# Patient Record
Sex: MALE | Race: WHITE | Employment: OTHER | URBAN - METROPOLITAN AREA
[De-identification: names, ages, dates, MRNs, and addresses within clinical notes are randomized per-mention and may not be internally consistent; named-entity substitution may affect disease eponyms.]

---

## 2017-03-30 ENCOUNTER — OFFICE VISIT (OUTPATIENT)
Dept: CARDIOLOGY CLINIC | Age: 82
End: 2017-03-30

## 2017-03-30 VITALS
WEIGHT: 202 LBS | HEART RATE: 68 BPM | OXYGEN SATURATION: 92 % | SYSTOLIC BLOOD PRESSURE: 170 MMHG | BODY MASS INDEX: 28.98 KG/M2 | DIASTOLIC BLOOD PRESSURE: 62 MMHG

## 2017-03-30 DIAGNOSIS — I25.83 CORONARY ARTERY DISEASE DUE TO LIPID RICH PLAQUE: Primary | ICD-10-CM

## 2017-03-30 DIAGNOSIS — I10 ESSENTIAL HYPERTENSION, BENIGN: Chronic | ICD-10-CM

## 2017-03-30 DIAGNOSIS — I25.10 CORONARY ARTERY DISEASE DUE TO LIPID RICH PLAQUE: Primary | ICD-10-CM

## 2017-03-30 DIAGNOSIS — R42 DIZZINESS: ICD-10-CM

## 2017-03-30 DIAGNOSIS — I35.9 AORTIC VALVE DISORDER: Chronic | ICD-10-CM

## 2017-03-30 PROCEDURE — G8484 FLU IMMUNIZE NO ADMIN: HCPCS | Performed by: NURSE PRACTITIONER

## 2017-03-30 PROCEDURE — G8598 ASA/ANTIPLAT THER USED: HCPCS | Performed by: NURSE PRACTITIONER

## 2017-03-30 PROCEDURE — 1123F ACP DISCUSS/DSCN MKR DOCD: CPT | Performed by: NURSE PRACTITIONER

## 2017-03-30 PROCEDURE — G8427 DOCREV CUR MEDS BY ELIG CLIN: HCPCS | Performed by: NURSE PRACTITIONER

## 2017-03-30 PROCEDURE — 4040F PNEUMOC VAC/ADMIN/RCVD: CPT | Performed by: NURSE PRACTITIONER

## 2017-03-30 PROCEDURE — 1036F TOBACCO NON-USER: CPT | Performed by: NURSE PRACTITIONER

## 2017-03-30 PROCEDURE — G8420 CALC BMI NORM PARAMETERS: HCPCS | Performed by: NURSE PRACTITIONER

## 2017-03-30 PROCEDURE — 99214 OFFICE O/P EST MOD 30 MIN: CPT | Performed by: NURSE PRACTITIONER

## 2017-03-30 RX ORDER — DONEPEZIL HYDROCHLORIDE 10 MG/1
10 TABLET, FILM COATED ORAL DAILY
COMMUNITY

## 2017-03-30 RX ORDER — RAMIPRIL 5 MG/1
5 CAPSULE ORAL 2 TIMES DAILY
Qty: 30 CAPSULE | Refills: 3 | Status: SHIPPED | OUTPATIENT
Start: 2017-03-30

## 2017-05-03 ENCOUNTER — OFFICE VISIT (OUTPATIENT)
Dept: CARDIOLOGY CLINIC | Age: 82
End: 2017-05-03

## 2017-05-03 VITALS
HEIGHT: 70 IN | WEIGHT: 203 LBS | DIASTOLIC BLOOD PRESSURE: 56 MMHG | BODY MASS INDEX: 29.06 KG/M2 | SYSTOLIC BLOOD PRESSURE: 120 MMHG | HEART RATE: 66 BPM

## 2017-05-03 DIAGNOSIS — I10 ESSENTIAL HYPERTENSION, BENIGN: ICD-10-CM

## 2017-05-03 DIAGNOSIS — I25.10 CORONARY ARTERY DISEASE INVOLVING NATIVE CORONARY ARTERY OF NATIVE HEART WITHOUT ANGINA PECTORIS: Primary | ICD-10-CM

## 2017-05-03 DIAGNOSIS — R42 DIZZY: ICD-10-CM

## 2017-05-03 DIAGNOSIS — I35.0 NONRHEUMATIC AORTIC VALVE STENOSIS: ICD-10-CM

## 2017-05-03 DIAGNOSIS — E78.00 HYPERCHOLESTEREMIA: ICD-10-CM

## 2017-05-03 PROCEDURE — 1036F TOBACCO NON-USER: CPT | Performed by: INTERNAL MEDICINE

## 2017-05-03 PROCEDURE — G8420 CALC BMI NORM PARAMETERS: HCPCS | Performed by: INTERNAL MEDICINE

## 2017-05-03 PROCEDURE — G8427 DOCREV CUR MEDS BY ELIG CLIN: HCPCS | Performed by: INTERNAL MEDICINE

## 2017-05-03 PROCEDURE — 99214 OFFICE O/P EST MOD 30 MIN: CPT | Performed by: INTERNAL MEDICINE

## 2017-05-03 PROCEDURE — 1123F ACP DISCUSS/DSCN MKR DOCD: CPT | Performed by: INTERNAL MEDICINE

## 2017-05-03 PROCEDURE — G8598 ASA/ANTIPLAT THER USED: HCPCS | Performed by: INTERNAL MEDICINE

## 2017-05-03 PROCEDURE — 4040F PNEUMOC VAC/ADMIN/RCVD: CPT | Performed by: INTERNAL MEDICINE

## 2017-05-03 RX ORDER — DILTIAZEM HYDROCHLORIDE 120 MG/1
CAPSULE, EXTENDED RELEASE ORAL
Qty: 90 CAPSULE | Refills: 3 | Status: SHIPPED | OUTPATIENT
Start: 2017-05-03

## 2017-05-03 RX ORDER — POTASSIUM CHLORIDE 7.45 MG/ML
10 INJECTION INTRAVENOUS ONCE
COMMUNITY
End: 2019-05-15

## 2017-05-03 RX ORDER — DILTIAZEM HYDROCHLORIDE 120 MG/1
120 CAPSULE, EXTENDED RELEASE ORAL DAILY
Qty: 30 CAPSULE | Refills: 11 | Status: SHIPPED | OUTPATIENT
Start: 2017-05-03 | End: 2017-05-03 | Stop reason: SDUPTHER

## 2017-05-04 ENCOUNTER — TELEPHONE (OUTPATIENT)
Dept: CARDIOLOGY CLINIC | Age: 82
End: 2017-05-04

## 2017-05-04 DIAGNOSIS — I50.9 CONGESTIVE HEART FAILURE, UNSPECIFIED CONGESTIVE HEART FAILURE CHRONICITY, UNSPECIFIED CONGESTIVE HEART FAILURE TYPE: Primary | ICD-10-CM

## 2017-05-04 DIAGNOSIS — E11.59 TYPE 2 DIABETES MELLITUS WITH OTHER CIRCULATORY COMPLICATION: ICD-10-CM

## 2017-08-03 ENCOUNTER — TELEPHONE (OUTPATIENT)
Dept: CARDIOLOGY CLINIC | Age: 82
End: 2017-08-03

## 2017-11-08 ENCOUNTER — HOSPITAL ENCOUNTER (OUTPATIENT)
Dept: NON INVASIVE DIAGNOSTICS | Age: 82
Discharge: HOME | End: 2017-11-08
Attending: INTERNAL MEDICINE | Admitting: INTERNAL MEDICINE

## 2017-11-08 ENCOUNTER — OFFICE VISIT (OUTPATIENT)
Dept: CARDIOLOGY CLINIC | Age: 82
End: 2017-11-08

## 2017-11-08 VITALS
HEART RATE: 76 BPM | DIASTOLIC BLOOD PRESSURE: 54 MMHG | OXYGEN SATURATION: 91 % | WEIGHT: 205 LBS | BODY MASS INDEX: 29.35 KG/M2 | SYSTOLIC BLOOD PRESSURE: 110 MMHG | HEIGHT: 70 IN

## 2017-11-08 DIAGNOSIS — R60.9 EDEMA, UNSPECIFIED TYPE: ICD-10-CM

## 2017-11-08 DIAGNOSIS — I87.393 CHRONIC VENOUS HYPERTENSION (IDIOPATHIC) WITH OTHER COMPLICATIONS OF BILATERAL LOWER EXTREMITY: ICD-10-CM

## 2017-11-08 DIAGNOSIS — E78.00 HYPERCHOLESTEREMIA: ICD-10-CM

## 2017-11-08 DIAGNOSIS — I35.0 NONRHEUMATIC AORTIC VALVE STENOSIS: ICD-10-CM

## 2017-11-08 DIAGNOSIS — I10 ESSENTIAL HYPERTENSION: ICD-10-CM

## 2017-11-08 DIAGNOSIS — I25.10 CORONARY ARTERY DISEASE INVOLVING NATIVE CORONARY ARTERY OF NATIVE HEART WITHOUT ANGINA PECTORIS: Primary | ICD-10-CM

## 2017-11-08 DIAGNOSIS — I87.2 VENOUS INSUFFICIENCY: ICD-10-CM

## 2017-11-08 DIAGNOSIS — I25.10 ATHEROSCLEROTIC HEART DISEASE OF NATIVE CORONARY ARTERY WITHOUT ANGINA PECTORIS: ICD-10-CM

## 2017-11-08 LAB
LEFT VENTRICULAR EJECTION FRACTION HIGH VALUE: 60 %
LEFT VENTRICULAR EJECTION FRACTION MODE: NORMAL
LV EF: 60 %
LVEF MODALITY: NORMAL

## 2017-11-08 PROCEDURE — G8427 DOCREV CUR MEDS BY ELIG CLIN: HCPCS | Performed by: INTERNAL MEDICINE

## 2017-11-08 PROCEDURE — 4040F PNEUMOC VAC/ADMIN/RCVD: CPT | Performed by: INTERNAL MEDICINE

## 2017-11-08 PROCEDURE — 99214 OFFICE O/P EST MOD 30 MIN: CPT | Performed by: INTERNAL MEDICINE

## 2017-11-08 PROCEDURE — G8598 ASA/ANTIPLAT THER USED: HCPCS | Performed by: INTERNAL MEDICINE

## 2017-11-08 PROCEDURE — G8484 FLU IMMUNIZE NO ADMIN: HCPCS | Performed by: INTERNAL MEDICINE

## 2017-11-08 PROCEDURE — 1036F TOBACCO NON-USER: CPT | Performed by: INTERNAL MEDICINE

## 2017-11-08 PROCEDURE — 1123F ACP DISCUSS/DSCN MKR DOCD: CPT | Performed by: INTERNAL MEDICINE

## 2017-11-08 PROCEDURE — G8419 CALC BMI OUT NRM PARAM NOF/U: HCPCS | Performed by: INTERNAL MEDICINE

## 2017-11-08 RX ORDER — ESOMEPRAZOLE MAGNESIUM 40 MG/1
20 FOR SUSPENSION ORAL DAILY
COMMUNITY
End: 2019-05-15

## 2017-11-08 NOTE — PROGRESS NOTES
Outpatient Visit  Consultation / History and Physical / Followup Visit     Referring Provider:   Candice Garcia MD     [phone]    Encounter Type:   []New Patient  [x]Interventional  [x]Followup Patient [x]Structural      Chief Complaint  Visit [] Acute [x] Chronic     Sx [x] None [] CP [] SOB [] Dizzy  Palps [] Fatigue     Problems CAD s/p MVPCI, AS, HTN, Chol     HPI  Doing well. Concerned with mild LE swelling and itching. Has been scratching a lot with multiple subcutaneous hemorrhages. Wearing [employer] \"compression copper socks\". Denies cp, sob. CV HPI  Symptom Y N Frequency Duration Severity Modifying Assoc Sx Other   CP [] [x]         SOB [] [x]         Dizzy [] [x]         Syncope [] [x]         Palpitations [] [x]         Other [] [x]           Compliance(Check if compliant):   [x]Med []Diet [x]Salt [x]Tob [x]Alc [x]Drg []Exercise  [x]Counseling given on all above above categories    MedHx:  has a past medical history of Aortic insufficiency; Aortic stenosis; Arthritis; Bleeding ulcer; CAD (coronary artery disease); CHF (congestive heart failure) ([facility]); COPD (chronic obstructive pulmonary disease) ([facility] [address] Diabetes mellitus ([facility] [address] History of cerebral infarction; Hyperlipidemia; Hypertension; Pneumonia; and Thyroid disease. SurgHx:  has a past surgical history that includes Diagnostic Cardiac Cath Lab Procedure; Coronary angioplasty with stent; Breast surgery; Tonsillectomy; Colonoscopy; Endoscopy, colon, diagnostic; and Cardiac surgery. SocHx:  reports that he quit smoking about 25 years ago. He does not have any smokeless tobacco history on file. He reports that he drinks alcohol. He reports that he does not use drugs. FamHx: family history includes Cancer in his sister; Heart Disease in his father.    CVMeds: Reviewed and will remain unchanged except as mentioned in A/P      Current Outpatient Prescriptions   Medication Sig Dispense Refill    Elastic Bandages & Supports (MEDICAL

## 2017-11-08 NOTE — COMMUNICATION BODY
Outpatient Visit  Consultation / History and Physical / Followup Visit     Referring Provider:   Edelmira Torres MD     [phone]    Encounter Type:   []New Patient  [x]Interventional  [x]Followup Patient [x]Structural      Chief Complaint  Visit [] Acute [x] Chronic     Sx [x] None [] CP [] SOB [] Dizzy  Palps [] Fatigue     Problems CAD s/p MVPCI, AS, HTN, Chol     HPI  Doing well. Concerned with mild LE swelling and itching. Has been scratching a lot with multiple subcutaneous hemorrhages. Wearing [employer] \"compression copper socks\". Denies cp, sob. CV HPI  Symptom Y N Frequency Duration Severity Modifying Assoc Sx Other   CP [] [x]         SOB [] [x]         Dizzy [] [x]         Syncope [] [x]         Palpitations [] [x]         Other [] [x]           Compliance(Check if compliant):   [x]Med []Diet [x]Salt [x]Tob [x]Alc [x]Drg []Exercise  [x]Counseling given on all above above categories    MedHx:  has a past medical history of Aortic insufficiency; Aortic stenosis; Arthritis; Bleeding ulcer; CAD (coronary artery disease); CHF (congestive heart failure) ([facility]); COPD (chronic obstructive pulmonary disease) ([facility] [address] Diabetes mellitus ([facility] [address] History of cerebral infarction; Hyperlipidemia; Hypertension; Pneumonia; and Thyroid disease. SurgHx:  has a past surgical history that includes Diagnostic Cardiac Cath Lab Procedure; Coronary angioplasty with stent; Breast surgery; Tonsillectomy; Colonoscopy; Endoscopy, colon, diagnostic; and Cardiac surgery. SocHx:  reports that he quit smoking about 25 years ago. He does not have any smokeless tobacco history on file. He reports that he drinks alcohol. He reports that he does not use drugs. FamHx: family history includes Cancer in his sister; Heart Disease in his father.    CVMeds: Reviewed and will remain unchanged except as mentioned in A/P      Current Outpatient Prescriptions   Medication Sig Dispense Refill    Elastic Bandages & Supports (MEDICAL COMPRESSION STOCKINGS) MISC 1 each by Does not apply route daily 1 each 2    Glucose Blood (GLUCOSE METER TEST VI) by In Vitro route      glucose blood VI test strips (ASCENSIA AUTODISC VI;ONE TOUCH ULTRA TEST VI) strip 1 each by In Vitro route daily As needed.  potassium chloride 10 MEQ/100ML Infuse 10 mEq intravenously once      DILT- MG extended release capsule TAKE 1 CAPSULE BY MOUTH DAILY 90 capsule 3    donepezil (ARICEPT) 10 MG tablet Take 10 mg by mouth daily      ramipril (ALTACE) 5 MG capsule Take 1 capsule by mouth 2 times daily 30 capsule 3    SYMBICORT 160-4.5 MCG/ACT AERO       simvastatin (ZOCOR) 20 MG tablet 10 mg nightly       acetaminophen (TYLENOL) 325 MG tablet Take 650 mg by mouth 2 times daily.  metFORMIN ER (GLUCOPHAGE-XR) 750 MG XR tablet Take 750 mg by mouth daily (with breakfast)       lansoprazole (PREVACID) 30 MG capsule Take 30 mg by mouth daily.  fish oil-omega-3 fatty acids 1000 MG capsule Take 2 g by mouth daily.  aspirin 81 MG tablet Take 81 mg by mouth daily.  levothyroxine (LEVOXYL) 75 MCG tablet Take 75 mcg by mouth daily.  furosemide (LASIX) 20 MG tablet Take 20 mg by mouth 2 times daily.  albuterol-ipratropium (COMBIVENT)  MCG/ACT inhaler Inhale 1 puff into the lungs every 4 hours as needed. No current facility-administered medications for this visit. Allergies: Dye [iodides];  Iodine; Metoprolol; and Nsaids   ROS:  [x]Full ROS obtained and negative except as mentioned in HPI  Exam:    Vitals:    11/08/17 1329   BP: (!) 110/54   Pulse: 76   SpO2: 91%      Appearance:  Alert, cooperative, no distress, appears stated age   Head:  Normocephalic, without obvious abnormality, atraumatic   Eyes:  PERRL, conjunctiva/corneas clear   Nose: Nares normal, no drainage or sinus tenderness   Throat: Lips, mucosa, and tongue normal   Neck: Supple, symmetrical, trachea midline, no adenopathy, thyroid: not enlarged,

## 2017-11-08 NOTE — LETTER
Tonsillectomy; Colonoscopy; Endoscopy, colon, diagnostic; and Cardiac surgery. SocHx:  reports that he quit smoking about 25 years ago. He does not have any smokeless tobacco history on file. He reports that he drinks alcohol. He reports that he does not use drugs. FamHx: family history includes Cancer in his sister; Heart Disease in his father. CVMeds: Reviewed and will remain unchanged except as mentioned in A/P      Current Outpatient Prescriptions   Medication Sig Dispense Refill    Elastic Bandages & Supports (MEDICAL COMPRESSION STOCKINGS) MISC 1 each by Does not apply route daily 1 each 2    Glucose Blood (GLUCOSE METER TEST VI) by In Vitro route      glucose blood VI test strips (ASCENSIA AUTODISC VI;ONE TOUCH ULTRA TEST VI) strip 1 each by In Vitro route daily As needed.  potassium chloride 10 MEQ/100ML Infuse 10 mEq intravenously once      DILT- MG extended release capsule TAKE 1 CAPSULE BY MOUTH DAILY 90 capsule 3    donepezil (ARICEPT) 10 MG tablet Take 10 mg by mouth daily      ramipril (ALTACE) 5 MG capsule Take 1 capsule by mouth 2 times daily 30 capsule 3    SYMBICORT 160-4.5 MCG/ACT AERO       simvastatin (ZOCOR) 20 MG tablet 10 mg nightly       acetaminophen (TYLENOL) 325 MG tablet Take 650 mg by mouth 2 times daily.  metFORMIN ER (GLUCOPHAGE-XR) 750 MG XR tablet Take 750 mg by mouth daily (with breakfast)       lansoprazole (PREVACID) 30 MG capsule Take 30 mg by mouth daily.  fish oil-omega-3 fatty acids 1000 MG capsule Take 2 g by mouth daily.  aspirin 81 MG tablet Take 81 mg by mouth daily.  levothyroxine (LEVOXYL) 75 MCG tablet Take 75 mcg by mouth daily.  furosemide (LASIX) 20 MG tablet Take 20 mg by mouth 2 times daily.  albuterol-ipratropium (COMBIVENT)  MCG/ACT inhaler Inhale 1 puff into the lungs every 4 hours as needed. No current facility-administered medications for this visit. Allergies: Dye [iodides];  Iodine; Metoprolol; and Nsaids   ROS:  Full ROS obtained and negative except as mentioned in HPI  Exam:    Vitals:    11/08/17 1329   BP: (!) 110/54   Pulse: 76   SpO2: 91%      Appearance:  Alert, cooperative, no distress, appears stated age   Head:  Normocephalic, without obvious abnormality, atraumatic   Eyes:  PERRL, conjunctiva/corneas clear   Nose: Nares normal, no drainage or sinus tenderness   Throat: Lips, mucosa, and tongue normal   Neck: Supple, symmetrical, trachea midline, no adenopathy, thyroid: not enlarged, symmetric, no tenderness/mass/nodules, no carotid bruit or JVD   Lungs:   Clear to auscultation bilaterally, respirations unlabored   Chest Wall:  No tenderness or deformity   Heart:  RRR, 3/6   Abdomen:   Soft, non-tender, bowel sounds active all four quadrants,  no masses, no organomegaly   Extremities: Extremities normal, atraumatic, no cyanosis, +edema  Numerous bilateral varicosities, subcut hemorrhages, spiders   Pulses: 2+ and symmetric   Skin: Skin color, texture, turgor normal, no rashes or lesions   Pysch: Normal mood and affect   Lymph: No cervical or axillary LA   Neurologic: Normal gross motor and sensory exam.      A/P:   ~CAD   Date EF Results   Sx   No concerning   Hx   MVPCI   LHC 9/13  Patent stents, nonobstructive   MPI 9/13  Inferoapical I/s   Plan   Continue aggressive medical treatment at doses above  No BB due to dizziness   ~AS   Date EF Detail   Sx   No concerning   Hx   No intervention   NYHA   I         II           III          IV   TTE 10/14  9/15  11/16 55%  55%  55% AS MG 18  AS MG 22  AS MG 23   Plan   Repeat echo in 1 year, sooner with symptoms   ~HTN  Today BP  Controlled  Borderline  Uncontrolled   Counseling  Diet/Salt  Exercise  Weight    Plan Continue current medications at doses detailed above  ~Hyperchol  Goal LDL  <100  <70     Counseling  Diet  Weight  Exercise   Lipid/liver Monitor  PCP  Cardio  Endocrine

## 2017-12-07 ENCOUNTER — HOSPITAL ENCOUNTER (OUTPATIENT)
Dept: VASCULAR LAB | Age: 82
Discharge: HOME | End: 2017-12-07
Attending: INTERNAL MEDICINE | Admitting: INTERNAL MEDICINE

## 2017-12-07 DIAGNOSIS — I87.393 CHRONIC VENOUS HYPERTENSION (IDIOPATHIC) WITH OTHER COMPLICATIONS OF BILATERAL LOWER EXTREMITY: ICD-10-CM

## 2017-12-07 DIAGNOSIS — I25.10 ATHEROSCLEROTIC HEART DISEASE OF NATIVE CORONARY ARTERY WITHOUT ANGINA PECTORIS: ICD-10-CM

## 2017-12-07 DIAGNOSIS — I87.2 VENOUS INSUFFICIENCY: ICD-10-CM

## 2017-12-07 DIAGNOSIS — R60.9 EDEMA, UNSPECIFIED TYPE: ICD-10-CM

## 2018-05-09 ENCOUNTER — OFFICE VISIT (OUTPATIENT)
Dept: CARDIOLOGY CLINIC | Age: 83
End: 2018-05-09

## 2018-05-09 VITALS
HEART RATE: 64 BPM | HEIGHT: 70 IN | DIASTOLIC BLOOD PRESSURE: 78 MMHG | BODY MASS INDEX: 29.25 KG/M2 | SYSTOLIC BLOOD PRESSURE: 136 MMHG | WEIGHT: 204.3 LBS

## 2018-05-09 DIAGNOSIS — R60.9 EDEMA, UNSPECIFIED TYPE: ICD-10-CM

## 2018-05-09 DIAGNOSIS — I10 ESSENTIAL HYPERTENSION: ICD-10-CM

## 2018-05-09 DIAGNOSIS — E78.00 HYPERCHOLESTEREMIA: ICD-10-CM

## 2018-05-09 DIAGNOSIS — I35.0 NONRHEUMATIC AORTIC VALVE STENOSIS: ICD-10-CM

## 2018-05-09 DIAGNOSIS — I25.10 CORONARY ARTERY DISEASE INVOLVING NATIVE CORONARY ARTERY OF NATIVE HEART WITHOUT ANGINA PECTORIS: Primary | ICD-10-CM

## 2018-05-09 PROCEDURE — 4040F PNEUMOC VAC/ADMIN/RCVD: CPT | Performed by: INTERNAL MEDICINE

## 2018-05-09 PROCEDURE — G8427 DOCREV CUR MEDS BY ELIG CLIN: HCPCS | Performed by: INTERNAL MEDICINE

## 2018-05-09 PROCEDURE — G8598 ASA/ANTIPLAT THER USED: HCPCS | Performed by: INTERNAL MEDICINE

## 2018-05-09 PROCEDURE — G8419 CALC BMI OUT NRM PARAM NOF/U: HCPCS | Performed by: INTERNAL MEDICINE

## 2018-05-09 PROCEDURE — 99214 OFFICE O/P EST MOD 30 MIN: CPT | Performed by: INTERNAL MEDICINE

## 2018-05-09 PROCEDURE — 1123F ACP DISCUSS/DSCN MKR DOCD: CPT | Performed by: INTERNAL MEDICINE

## 2018-05-09 PROCEDURE — 1036F TOBACCO NON-USER: CPT | Performed by: INTERNAL MEDICINE

## 2018-11-02 PROBLEM — I87.2 VENOUS INSUFFICIENCY: Status: ACTIVE | Noted: 2018-11-02

## 2018-11-07 ENCOUNTER — HOSPITAL ENCOUNTER (OUTPATIENT)
Dept: NON INVASIVE DIAGNOSTICS | Age: 83
Discharge: HOME | End: 2018-11-07
Payer: MEDICARE

## 2018-11-07 ENCOUNTER — OFFICE VISIT (OUTPATIENT)
Dept: CARDIOLOGY CLINIC | Age: 83
End: 2018-11-07
Payer: MEDICARE

## 2018-11-07 VITALS
SYSTOLIC BLOOD PRESSURE: 126 MMHG | HEIGHT: 70 IN | BODY MASS INDEX: 27.49 KG/M2 | OXYGEN SATURATION: 92 % | DIASTOLIC BLOOD PRESSURE: 70 MMHG | WEIGHT: 192 LBS | HEART RATE: 62 BPM

## 2018-11-07 DIAGNOSIS — I25.10 CORONARY ARTERY DISEASE INVOLVING NATIVE CORONARY ARTERY OF NATIVE HEART WITHOUT ANGINA PECTORIS: Primary | ICD-10-CM

## 2018-11-07 DIAGNOSIS — I35.0 NONRHEUMATIC AORTIC VALVE STENOSIS: ICD-10-CM

## 2018-11-07 DIAGNOSIS — R60.9 EDEMA, UNSPECIFIED TYPE: ICD-10-CM

## 2018-11-07 DIAGNOSIS — E78.00 HYPERCHOLESTEREMIA: ICD-10-CM

## 2018-11-07 DIAGNOSIS — I87.2 VENOUS INSUFFICIENCY: ICD-10-CM

## 2018-11-07 DIAGNOSIS — I10 ESSENTIAL HYPERTENSION: ICD-10-CM

## 2018-11-07 PROCEDURE — 1036F TOBACCO NON-USER: CPT | Performed by: INTERNAL MEDICINE

## 2018-11-07 PROCEDURE — G8419 CALC BMI OUT NRM PARAM NOF/U: HCPCS | Performed by: INTERNAL MEDICINE

## 2018-11-07 PROCEDURE — 93306 TTE W/DOPPLER COMPLETE: CPT

## 2018-11-07 PROCEDURE — G8427 DOCREV CUR MEDS BY ELIG CLIN: HCPCS | Performed by: INTERNAL MEDICINE

## 2018-11-07 PROCEDURE — 1101F PT FALLS ASSESS-DOCD LE1/YR: CPT | Performed by: INTERNAL MEDICINE

## 2018-11-07 PROCEDURE — G8598 ASA/ANTIPLAT THER USED: HCPCS | Performed by: INTERNAL MEDICINE

## 2018-11-07 PROCEDURE — G8484 FLU IMMUNIZE NO ADMIN: HCPCS | Performed by: INTERNAL MEDICINE

## 2018-11-07 PROCEDURE — 99214 OFFICE O/P EST MOD 30 MIN: CPT | Performed by: INTERNAL MEDICINE

## 2018-11-07 PROCEDURE — 4040F PNEUMOC VAC/ADMIN/RCVD: CPT | Performed by: INTERNAL MEDICINE

## 2018-11-07 PROCEDURE — 1123F ACP DISCUSS/DSCN MKR DOCD: CPT | Performed by: INTERNAL MEDICINE

## 2018-11-07 RX ORDER — LANOLIN ALCOHOL/MO/W.PET/CERES
3 CREAM (GRAM) TOPICAL DAILY
COMMUNITY

## 2019-05-15 ENCOUNTER — OFFICE VISIT (OUTPATIENT)
Dept: CARDIOLOGY CLINIC | Age: 84
End: 2019-05-15
Payer: MEDICARE

## 2019-05-15 VITALS
HEART RATE: 74 BPM | HEIGHT: 70 IN | BODY MASS INDEX: 27.2 KG/M2 | DIASTOLIC BLOOD PRESSURE: 70 MMHG | WEIGHT: 190 LBS | SYSTOLIC BLOOD PRESSURE: 110 MMHG

## 2019-05-15 DIAGNOSIS — E78.00 HYPERCHOLESTEREMIA: ICD-10-CM

## 2019-05-15 DIAGNOSIS — I10 ESSENTIAL HYPERTENSION: ICD-10-CM

## 2019-05-15 DIAGNOSIS — I35.0 NONRHEUMATIC AORTIC VALVE STENOSIS: ICD-10-CM

## 2019-05-15 DIAGNOSIS — I25.10 CORONARY ARTERY DISEASE INVOLVING NATIVE CORONARY ARTERY OF NATIVE HEART WITHOUT ANGINA PECTORIS: Primary | ICD-10-CM

## 2019-05-15 PROCEDURE — 99214 OFFICE O/P EST MOD 30 MIN: CPT | Performed by: INTERNAL MEDICINE

## 2019-05-15 PROCEDURE — G8598 ASA/ANTIPLAT THER USED: HCPCS | Performed by: INTERNAL MEDICINE

## 2019-05-15 PROCEDURE — 1036F TOBACCO NON-USER: CPT | Performed by: INTERNAL MEDICINE

## 2019-05-15 PROCEDURE — 4040F PNEUMOC VAC/ADMIN/RCVD: CPT | Performed by: INTERNAL MEDICINE

## 2019-05-15 PROCEDURE — G8419 CALC BMI OUT NRM PARAM NOF/U: HCPCS | Performed by: INTERNAL MEDICINE

## 2019-05-15 PROCEDURE — 1123F ACP DISCUSS/DSCN MKR DOCD: CPT | Performed by: INTERNAL MEDICINE

## 2019-05-15 PROCEDURE — G8427 DOCREV CUR MEDS BY ELIG CLIN: HCPCS | Performed by: INTERNAL MEDICINE

## 2019-05-15 RX ORDER — OMEPRAZOLE 20 MG/1
20 CAPSULE, DELAYED RELEASE ORAL DAILY
COMMUNITY

## 2019-05-15 NOTE — PROGRESS NOTES
[address]       H+P // CONSULT // OUTPATIENT VISIT // FOLLOWUP VISIT     Referring Doctor Mary Puga MD   Encounter Type Followup     CHIEF COMPLAINT     Visit Type Chronic   Symptoms None   Problems CAD, AS, HTN, CHOL, EDEMA      HISTORY OF PRESENT ILLNESS      Doing fair. SOB stable. Currently in NH in memory unit.  CAD - Denies cp, dizziness, syncope, palpitations.  AS - last echo with moderate AS MG 31. SOB stable.  HTN - Ambulatory BP readings in good range   CHOL - Last cholesterol reviewed and in good range.  MED - Compliant with CV meds listed below without notable side effects     COMPLIANCE     Category Meds Diet Salt Exercise Tobacco Alcohol Drugs   Compliant [x] [] [] [] [x] [x] [x]   [x]Counseling given on all above above categories    HISTORY/ALLERGIES/ROS     MedHx:  has a past medical history of Aortic insufficiency, Aortic stenosis, Arthritis, Bleeding ulcer, CAD (coronary artery disease), CHF (congestive heart failure) ([facility] [address] COPD (chronic obstructive pulmonary disease) ([facility] [address] Diabetes mellitus ([facility] [address] History of cerebral infarction, Hyperlipidemia, Hypertension, Pneumonia, and Thyroid disease. SurgHx:  has a past surgical history that includes Diagnostic Cardiac Cath Lab Procedure; Coronary angioplasty with stent; Breast surgery; Tonsillectomy; Colonoscopy; Endoscopy, colon, diagnostic; and Cardiac surgery. SocHx:   reports that he quit smoking about 27 years ago. He has never used smokeless tobacco. He reports that he drinks alcohol. He reports that he does not use drugs. FamHx: family history includes Cancer in his sister; Heart Disease in his father. Allergies: Dye [iodides];  Iodine; Metoprolol; and Nsaids   ROS:  [x]Full ROS obtained and negative except as mentioned in HPI     MEDICATIONS      Current Outpatient Medications   Medication Sig Dispense Refill    melatonin 3 MG TABS tablet Take 3 mg by mouth daily      fluticasone-salmeterol (ADVAIR HFA) 230-21 MCG/ACT inhaler Inhale 2 puffs into the lungs 2 times daily      esomeprazole Magnesium (NEXIUM) 40 MG PACK Take 20 mg by mouth daily       Elastic Bandages & Supports (MEDICAL COMPRESSION STOCKINGS) MISC 1 each by Does not apply route daily 1 each 2    Glucose Blood (GLUCOSE METER TEST VI) by In Vitro route      glucose blood VI test strips (ASCENSIA AUTODISC VI;ONE TOUCH ULTRA TEST VI) strip 1 each by In Vitro route daily As needed.  potassium chloride 10 MEQ/100ML Infuse 10 mEq intravenously once      DILT- MG extended release capsule TAKE 1 CAPSULE BY MOUTH DAILY (Patient taking differently: TAKE 2 CAPSULE BY MOUTH DAILY) 90 capsule 3    donepezil (ARICEPT) 10 MG tablet Take 10 mg by mouth daily      ramipril (ALTACE) 5 MG capsule Take 1 capsule by mouth 2 times daily (Patient taking differently: Take 5 mg by mouth daily ) 30 capsule 3    SYMBICORT 160-4.5 MCG/ACT AERO       simvastatin (ZOCOR) 20 MG tablet 5 mg nightly       acetaminophen (TYLENOL) 325 MG tablet Take 650 mg by mouth 2 times daily.  metFORMIN ER (GLUCOPHAGE-XR) 750 MG XR tablet Take 375 mg by mouth daily (with breakfast)       lansoprazole (PREVACID) 30 MG capsule Take 30 mg by mouth daily.  fish oil-omega-3 fatty acids 1000 MG capsule Take 2 g by mouth daily.  aspirin 81 MG tablet Take 81 mg by mouth daily.  levothyroxine (LEVOXYL) 75 MCG tablet Take 75 mcg by mouth daily.  furosemide (LASIX) 20 MG tablet 20 mg Take 2 tablets qam and 1 tablet in the afternoon      albuterol-ipratropium (COMBIVENT)  MCG/ACT inhaler Inhale 1 puff into the lungs every 4 hours as needed. No current facility-administered medications for this visit.       Reviewed with patient and will remain unchanged except as mentioned in A/P  PHYSICAL EXAM     Vitals:    05/15/19 1101   BP: 110/70   Pulse: 74      Gen Alert, coop, no distress Heart  Rrr, 4/6   Head NC, AT, no abnorm Abd  Soft, NT, +BS, no mass, no OM   Eyes PER, conj/corn clear Ext  Ext nl, AT, no C/C/E   Nose Nares nl, no drain, NT Pulse 2+ and symmetric   Throat Lips, mucosa, tongue nl Skin Col/text/turg nl, no vis rash/les   Neck S/S, TM, NT, no bruit/JVD Psych Nl mood and affect   Lung CTA-B, unlabored, no DTP Lymph   No cervical or axillary LA   Ch wall NT, no deform Neuro  Nl gross M/S exam     ASSESSMENT AND PLAN     ~CAD   Date EF Results   Sx   No concerning   Hx   MVPCI   LHC 9/13 60% Patent stents, nonobstructive Silvia Ferns)   MPI 9/13  Inferoapical I/s   Plan   Continue aggressive medical treatment at doses above  No BB due to dizziness   ~AS   Date EF Detail   Sx   No concerning   Hx   No intervention   TTE 10/14  9/15  11/16  11/17  11/18 55%  55%  55%  60%  60% AS MG 18  AS MG 22  AS MG 23  Mod AS MG 25  Mod AS MG 31   Plan   Repeat echo in 6 months   ~HTN  Today BP Controlled   Counseling Counseled on diet/salt, exercise and ideal body weight   Plan Continue current meds at doses above   ~Hyperchol  Goal LDL <70   Counseling Counseled on diet, exercise and ideal body weight   Plan PCP liver/lipid surveillance   10/17 HDL:43, LDL:82  ~Edema/venous insuff  Bilateral  Plan Decrease salt, compression, elevation, continue lasix   Compression hose  ~Followup  Interval: 6 months  Tests/Labs: none

## 2020-01-24 ENCOUNTER — TELEPHONE (OUTPATIENT)
Dept: CARDIOLOGY CLINIC | Age: 85
End: 2020-01-24

## 2020-01-24 NOTE — TELEPHONE ENCOUNTER
Any open clinic day is fine then. It does not need to be a [facility] clinic if there are no open echo slots. Thanks!

## 2020-01-24 NOTE — TELEPHONE ENCOUNTER
Called Son to resched pt's 3/16/20 echo and dce appt due to change is sched. Son is not avail on 3/18/20 and there were no echo available on Thursdays. Unable to bring pt 2 separate days please advise where pt can be scheduled.

## 2020-03-26 ENCOUNTER — TELEPHONE (OUTPATIENT)
Dept: CARDIOLOGY CLINIC | Age: 85
End: 2020-03-26

## 2020-03-26 NOTE — TELEPHONE ENCOUNTER
Due to having to reschedule the echo until after ,  is requesting a new order be place in Epic as the current one has . Thank you.

## 2020-04-01 PROBLEM — I25.10 CORONARY ARTERY DISEASE DUE TO LIPID RICH PLAQUE: Status: ACTIVE | Noted: 2020-04-01

## 2020-04-01 PROBLEM — I25.83 CORONARY ARTERY DISEASE DUE TO LIPID RICH PLAQUE: Status: ACTIVE | Noted: 2020-04-01
